# Patient Record
Sex: MALE | Race: OTHER | NOT HISPANIC OR LATINO | URBAN - METROPOLITAN AREA
[De-identification: names, ages, dates, MRNs, and addresses within clinical notes are randomized per-mention and may not be internally consistent; named-entity substitution may affect disease eponyms.]

---

## 2018-01-10 ENCOUNTER — EMERGENCY (EMERGENCY)
Facility: HOSPITAL | Age: 23
LOS: 1 days | Discharge: HOME | End: 2018-01-10
Admitting: EMERGENCY MEDICINE
Payer: COMMERCIAL

## 2018-01-10 VITALS
SYSTOLIC BLOOD PRESSURE: 122 MMHG | HEART RATE: 76 BPM | OXYGEN SATURATION: 100 % | TEMPERATURE: 98 F | DIASTOLIC BLOOD PRESSURE: 70 MMHG | RESPIRATION RATE: 16 BRPM

## 2018-01-10 DIAGNOSIS — F43.23 ADJUSTMENT DISORDER WITH MIXED ANXIETY AND DEPRESSED MOOD: ICD-10-CM

## 2018-01-10 PROCEDURE — 90792 PSYCH DIAG EVAL W/MED SRVCS: CPT

## 2018-01-10 PROCEDURE — 70450 CT HEAD/BRAIN W/O DYE: CPT | Mod: 26

## 2018-01-10 PROCEDURE — 99285 EMERGENCY DEPT VISIT HI MDM: CPT

## 2018-01-10 NOTE — ED BEHAVIORAL HEALTH ASSESSMENT NOTE - SUICIDE PROTECTIVE FACTORS
Identifies reasons for living/Future oriented/Engaged in work or school/Responsibility to family and others/Supportive social network or family

## 2018-01-10 NOTE — ED PROVIDER NOTE - MEDICAL DECISION MAKING DETAILS
23y/o Male no prior psych hx brought to for psych eval 2/2 self inflicted laceration last night to his rt wrist.  Pt is LHD and tdap UTD.  Rt wrist w/ one single superficial lac approximately 2cm, healing- strong pulses-  Clear for d/c- 23y/o Male no prior psych hx brought to for psych eval 2/2 self inflicted laceration last night to his rt wrist.  Pt is LHD and tdap UTD.  Rt wrist w/ one single superficial lac approximately 2cm, healing- strong pulses-  Neuro- CNII- XII grossly intact, alert and oriented x 3- Clear for d/c-

## 2018-01-10 NOTE — ED BEHAVIORAL HEALTH ASSESSMENT NOTE - SAFETY PLAN DETAILS
patient advised to return to ED or call 911 for any worsening symptoms and patient agreed. Call [email or web address] if you need to speak with someone 24/7. Secure sharps and medications in the home
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
## 2018-01-10 NOTE — ED ADULT NURSE NOTE - OBJECTIVE STATEMENT
Pt. A&Ox4, presents to ER with haydee and haydee's father and for attempted suicide last night. Pt. states he cut his right wrist with a piece of glass. Denies any other SI/HI thoughts or attempts. Denies psych history or medical problems. haydee's father states that pt. has been kicked out of foster care and living in his car x few months. Now living with haydee's family. No alcohol or drug used noted. Pt. states that he has been having headaches and thinks he might have a tumor. VSS in triage, safety measures maintained. Will continue to monitor.

## 2018-01-10 NOTE — ED BEHAVIORAL HEALTH NOTE - BEHAVIORAL HEALTH NOTE
Writer was informed patient was medically and psychiatrically cleared for discharge.  Writer met with patient to discuss urgent referral.  Patient states he has [employer] insurance.  He is agreeable to an outpatient referral stating he works 30 hours a week, but will make accommodations around an appointment.  Patient can receive voice messages at [phone] or emails to [email or web address]. Writer was informed patient was medically and psychiatrically cleared for discharge.  Writer met with patient to discuss urgent referral.  Patient states he has [employer] insurance.  He is agreeable to an outpatient referral stating he works 30 hours a week, but will make accommodations around an appointment.  Patient can receive voice messages at [phone] or emails to [email or web address].   Cirar called [facility] [phone] however they will not see patient's under 21 Writer called [facility] [phone] but was informed patient will have to call on his own to schedule his own appointment.  Writer called patient  left a voicemail with instructions and sent patient an email with information.
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
## 2018-01-10 NOTE — ED BEHAVIORAL HEALTH ASSESSMENT NOTE - DESCRIPTION
calm and cooperative  Vital Signs Last 24 Hrs  T(C): 36.7 (10 Andrew 2018 11:59), Max: 36.7 (10 Andrew 2018 11:59)  T(F): 98 (10 Andrew 2018 11:59), Max: 98 (10 Andrew 2018 11:59)  HR: 76 (10 Andrew 2018 11:59) (76 - 76)  BP: 122/70 (10 Andrew 2018 11:59) (122/70 - 122/70)  BP(mean): --  RR: 16 (10 Andrew 2018 11:59) (16 - 16)  SpO2: 100% (10 Andrew 2018 11:59) (100% - 100%) childhood asthma lives with girlfriend and her family, works at [employer]

## 2018-01-10 NOTE — ED PROVIDER NOTE - CHPI ED SYMPTOMS NEG
no weakness/no weight loss/no confusion/no agitation/no suicidal/no hallucinations/no change in level of consciousness/no homicidal/no paranoia/no disorientation

## 2018-01-10 NOTE — ED PROVIDER NOTE - OBJECTIVE STATEMENT
The patient is a 22y Male The patient is a 22y Male no prior psych hx brought to for psych eval 2/2 self inflicted laceration last night to his wrist.  Pt is LHD and tdap UTD.  Pt reported cutting his wrist w/ a broken glass.  Pt reported ongoing intermittent HA since October of 2017 w/o nausea or vomiting, + blurry vision at times, takes tylenol for HA w/ some improvement.  Pt reports feeling anxious thinking that he may have a tumor.  Pt denies all other medical complaints at present, no Etoh or illicit drugs.  Denies trauma or falls, no back or neck pain, no abd/flank pain, no uti/urinary symptoms, no fever or chills, no cp or sob, no palpitations or diaphoresis.  Denies SI/HI, no AVH.
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
## 2018-01-11 NOTE — ED BEHAVIORAL HEALTH NOTE - BEHAVIORAL HEALTH NOTE
This writer called the patient and spoke with him regarding entering OP psychiatric treatment. He stated he received the email sent by a colleague yesterday, telling him that he must call the clinic identified and make his own appointment. He stated he plans to do so. No further social work intervention is needed at this time. This writer called the patient at [phone], and spoke with him regarding entering OP psychiatric treatment. He stated he received the email sent by a colleague yesterday, telling him that he must call the clinic identified, [facility], [phone], as they require, and make his own appointment. He stated he plans to do so. No further social work intervention is needed at this time.

## 2021-09-10 ENCOUNTER — EMERGENCY (EMERGENCY)
Facility: HOSPITAL | Age: 26
LOS: 0 days | Discharge: HOME | End: 2021-09-10
Attending: STUDENT IN AN ORGANIZED HEALTH CARE EDUCATION/TRAINING PROGRAM
Payer: COMMERCIAL

## 2021-09-10 VITALS
TEMPERATURE: 98 F | OXYGEN SATURATION: 100 % | HEART RATE: 69 BPM | SYSTOLIC BLOOD PRESSURE: 121 MMHG | DIASTOLIC BLOOD PRESSURE: 60 MMHG | RESPIRATION RATE: 17 BRPM

## 2021-09-10 VITALS
HEIGHT: 72 IN | WEIGHT: 171.96 LBS | DIASTOLIC BLOOD PRESSURE: 55 MMHG | TEMPERATURE: 98 F | OXYGEN SATURATION: 100 % | HEART RATE: 80 BPM | SYSTOLIC BLOOD PRESSURE: 123 MMHG | RESPIRATION RATE: 18 BRPM

## 2021-09-10 DIAGNOSIS — S06.0X0A CONCUSSION WITHOUT LOSS OF CONSCIOUSNESS, INITIAL ENCOUNTER: ICD-10-CM

## 2021-09-10 DIAGNOSIS — Z23 ENCOUNTER FOR IMMUNIZATION: ICD-10-CM
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
## 2021-09-10 DIAGNOSIS — M54.5 LOW BACK PAIN: ICD-10-CM

## 2021-09-10 DIAGNOSIS — R51.9 HEADACHE, UNSPECIFIED: ICD-10-CM

## 2021-09-10 DIAGNOSIS — R42 DIZZINESS AND GIDDINESS: ICD-10-CM

## 2021-09-10 DIAGNOSIS — M79.651 PAIN IN RIGHT THIGH: ICD-10-CM
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
## 2021-09-10 DIAGNOSIS — S50.311A ABRASION OF RIGHT ELBOW, INITIAL ENCOUNTER: ICD-10-CM

## 2021-09-10 DIAGNOSIS — M25.521 PAIN IN RIGHT ELBOW: ICD-10-CM

## 2021-09-10 DIAGNOSIS — M89.70: ICD-10-CM

## 2021-09-10 LAB
ALBUMIN SERPL ELPH-MCNC: 4.4 G/DL — SIGNIFICANT CHANGE UP (ref 3.3–5)
ALP SERPL-CCNC: 59 U/L — SIGNIFICANT CHANGE UP (ref 40–120)
ALT FLD-CCNC: 27 U/L — SIGNIFICANT CHANGE UP (ref 12–78)
ANION GAP SERPL CALC-SCNC: 4 MMOL/L — LOW (ref 5–17)
AST SERPL-CCNC: 25 U/L — SIGNIFICANT CHANGE UP (ref 15–37)
BASOPHILS # BLD AUTO: 0.03 K/UL — SIGNIFICANT CHANGE UP (ref 0–0.2)
BASOPHILS NFR BLD AUTO: 0.4 % — SIGNIFICANT CHANGE UP (ref 0–2)
BILIRUB SERPL-MCNC: 1.2 MG/DL — SIGNIFICANT CHANGE UP (ref 0.2–1.2)
BUN SERPL-MCNC: 13 MG/DL — SIGNIFICANT CHANGE UP (ref 7–23)
CALCIUM SERPL-MCNC: 9.2 MG/DL — SIGNIFICANT CHANGE UP (ref 8.5–10.1)
CHLORIDE SERPL-SCNC: 108 MMOL/L — SIGNIFICANT CHANGE UP (ref 96–108)
CO2 SERPL-SCNC: 27 MMOL/L — SIGNIFICANT CHANGE UP (ref 22–31)
CREAT SERPL-MCNC: 0.72 MG/DL — SIGNIFICANT CHANGE UP (ref 0.5–1.3)
EOSINOPHIL # BLD AUTO: 0.03 K/UL — SIGNIFICANT CHANGE UP (ref 0–0.5)
EOSINOPHIL NFR BLD AUTO: 0.4 % — SIGNIFICANT CHANGE UP (ref 0–6)
GLUCOSE SERPL-MCNC: 85 MG/DL — SIGNIFICANT CHANGE UP (ref 70–99)
HCT VFR BLD CALC: 41.4 % — SIGNIFICANT CHANGE UP (ref 39–50)
HGB BLD-MCNC: 13.6 G/DL — SIGNIFICANT CHANGE UP (ref 13–17)
IMM GRANULOCYTES NFR BLD AUTO: 0.1 % — SIGNIFICANT CHANGE UP (ref 0–1.5)
LACTATE SERPL-SCNC: 0.6 MMOL/L — LOW (ref 0.7–2)
LIDOCAIN IGE QN: 95 U/L — SIGNIFICANT CHANGE UP (ref 73–393)
LYMPHOCYTES # BLD AUTO: 1.99 K/UL — SIGNIFICANT CHANGE UP (ref 1–3.3)
LYMPHOCYTES # BLD AUTO: 24.2 % — SIGNIFICANT CHANGE UP (ref 13–44)
MCHC RBC-ENTMCNC: 28.4 PG — SIGNIFICANT CHANGE UP (ref 27–34)
MCHC RBC-ENTMCNC: 32.9 GM/DL — SIGNIFICANT CHANGE UP (ref 32–36)
MCV RBC AUTO: 86.4 FL — SIGNIFICANT CHANGE UP (ref 80–100)
MONOCYTES # BLD AUTO: 0.41 K/UL — SIGNIFICANT CHANGE UP (ref 0–0.9)
MONOCYTES NFR BLD AUTO: 5 % — SIGNIFICANT CHANGE UP (ref 2–14)
NEUTROPHILS # BLD AUTO: 5.75 K/UL — SIGNIFICANT CHANGE UP (ref 1.8–7.4)
NEUTROPHILS NFR BLD AUTO: 69.9 % — SIGNIFICANT CHANGE UP (ref 43–77)
PLATELET # BLD AUTO: 359 K/UL — SIGNIFICANT CHANGE UP (ref 150–400)
POTASSIUM SERPL-MCNC: 4.3 MMOL/L — SIGNIFICANT CHANGE UP (ref 3.5–5.3)
POTASSIUM SERPL-SCNC: 4.3 MMOL/L — SIGNIFICANT CHANGE UP (ref 3.5–5.3)
PROT SERPL-MCNC: 7.8 GM/DL — SIGNIFICANT CHANGE UP (ref 6–8.3)
RBC # BLD: 4.79 M/UL — SIGNIFICANT CHANGE UP (ref 4.2–5.8)
RBC # FLD: 12.9 % — SIGNIFICANT CHANGE UP (ref 10.3–14.5)
SODIUM SERPL-SCNC: 139 MMOL/L — SIGNIFICANT CHANGE UP (ref 135–145)
WBC # BLD: 8.22 K/UL — SIGNIFICANT CHANGE UP (ref 3.8–10.5)
WBC # FLD AUTO: 8.22 K/UL — SIGNIFICANT CHANGE UP (ref 3.8–10.5)

## 2021-09-10 PROCEDURE — 83690 ASSAY OF LIPASE: CPT

## 2021-09-10 PROCEDURE — 85025 COMPLETE CBC W/AUTO DIFF WBC: CPT

## 2021-09-10 PROCEDURE — 73080 X-RAY EXAM OF ELBOW: CPT | Mod: RT

## 2021-09-10 PROCEDURE — 99285 EMERGENCY DEPT VISIT HI MDM: CPT

## 2021-09-10 PROCEDURE — 74177 CT ABD & PELVIS W/CONTRAST: CPT | Mod: 26,MA

## 2021-09-10 PROCEDURE — 72125 CT NECK SPINE W/O DYE: CPT | Mod: 26,MA

## 2021-09-10 PROCEDURE — 72170 X-RAY EXAM OF PELVIS: CPT

## 2021-09-10 PROCEDURE — 96374 THER/PROPH/DIAG INJ IV PUSH: CPT | Mod: XU

## 2021-09-10 PROCEDURE — 83605 ASSAY OF LACTIC ACID: CPT

## 2021-09-10 PROCEDURE — 90471 IMMUNIZATION ADMIN: CPT

## 2021-09-10 PROCEDURE — 70450 CT HEAD/BRAIN W/O DYE: CPT

## 2021-09-10 PROCEDURE — 71045 X-RAY EXAM CHEST 1 VIEW: CPT | Mod: 26,76

## 2021-09-10 PROCEDURE — 74177 CT ABD & PELVIS W/CONTRAST: CPT

## 2021-09-10 PROCEDURE — 90715 TDAP VACCINE 7 YRS/> IM: CPT

## 2021-09-10 PROCEDURE — 36415 COLL VENOUS BLD VENIPUNCTURE: CPT

## 2021-09-10 PROCEDURE — 70450 CT HEAD/BRAIN W/O DYE: CPT | Mod: 26,MA

## 2021-09-10 PROCEDURE — 72170 X-RAY EXAM OF PELVIS: CPT | Mod: 26

## 2021-09-10 PROCEDURE — 71045 X-RAY EXAM CHEST 1 VIEW: CPT

## 2021-09-10 PROCEDURE — 99284 EMERGENCY DEPT VISIT MOD MDM: CPT | Mod: 25

## 2021-09-10 PROCEDURE — 73080 X-RAY EXAM OF ELBOW: CPT | Mod: 26,RT

## 2021-09-10 PROCEDURE — 72125 CT NECK SPINE W/O DYE: CPT

## 2021-09-10 PROCEDURE — 80053 COMPREHEN METABOLIC PANEL: CPT

## 2021-09-10 RX ORDER — ACETAMINOPHEN 500 MG
975 TABLET ORAL ONCE
Refills: 0 | Status: COMPLETED | OUTPATIENT
Start: 2021-09-10 | End: 2021-09-10

## 2021-09-10 RX ORDER — TETANUS TOXOID, REDUCED DIPHTHERIA TOXOID AND ACELLULAR PERTUSSIS VACCINE, ADSORBED 5; 2.5; 8; 8; 2.5 [IU]/.5ML; [IU]/.5ML; UG/.5ML; UG/.5ML; UG/.5ML
0.5 SUSPENSION INTRAMUSCULAR ONCE
Refills: 0 | Status: COMPLETED | OUTPATIENT
Start: 2021-09-10 | End: 2021-09-10

## 2021-09-10 RX ORDER — KETOROLAC TROMETHAMINE 30 MG/ML
15 SYRINGE (ML) INJECTION ONCE
Refills: 0 | Status: DISCONTINUED | OUTPATIENT
Start: 2021-09-10 | End: 2021-09-10

## 2021-09-10 RX ORDER — LIDOCAINE 4 G/100G
1 CREAM TOPICAL ONCE
Refills: 0 | Status: COMPLETED | OUTPATIENT
Start: 2021-09-10 | End: 2021-09-10

## 2021-09-10 RX ADMIN — Medication 15 MILLIGRAM(S): at 14:14

## 2021-09-10 RX ADMIN — TETANUS TOXOID, REDUCED DIPHTHERIA TOXOID AND ACELLULAR PERTUSSIS VACCINE, ADSORBED 0.5 MILLILITER(S): 5; 2.5; 8; 8; 2.5 SUSPENSION INTRAMUSCULAR at 12:46

## 2021-09-10 RX ADMIN — LIDOCAINE 1 PATCH: 4 CREAM TOPICAL at 14:44

## 2021-09-10 RX ADMIN — Medication 975 MILLIGRAM(S): at 12:47

## 2021-09-10 NOTE — ED ADULT TRIAGE NOTE - CHIEF COMPLAINT QUOTE
fell out of back of 4-5ft trailer at work. c/o right lower back pain, right elbow pain and headache. Denies loc. Laceration on right elbow. Denies dizziness. fell out of back of 4-5ft trailer at work. c/o right lower back pain, right elbow pain and headache. Denies loc. Abrasion on right elbow. Denies dizziness.

## 2021-09-10 NOTE — ED STATDOCS - NSFOLLOWUPINSTRUCTIONS_ED_ALL_ED_FT
You were seen in the Emergency Department for back pain after a fall.   Today you had bloodwork, CT scan, the results are attached to this paperwork.     Please follow-up with the orthopedic doctor within the next 1-2 weeks, a rapid referral was placed for you so expect a call within the next 1-2 days.     1) Advance activity as tolerated.    2) Continue all previously prescribed medications as directed.    3) Follow up with your primary care physician in 24-48 hours - take copies of your results.    4) Return to the Emergency Department for worsening or persistent symptoms, and/or ANY NEW OR CONCERNING SYMPTOMS as described below.    Back Pain    Back pain is very common in adults. The cause of back pain is rarely dangerous and the pain often gets better over time. The cause of your back pain may not be known and may include strain of muscles or ligaments, degeneration of the spinal disks, or arthritis. Occasionally the pain may radiate down your leg(s). Over-the-counter medicines to reduce pain and inflammation are often the most helpful. Stretching and remaining active frequently helps the healing process.     Low Back Strain  A strain is a stretch or tear in a muscle or the strong cords of tissue that attach muscle to bone (tendons). Strains of the lower back (lumbar spine) are a common cause of low back pain. A strain occurs when muscles or tendons are torn or are stretched beyond their limits. The muscles may become inflamed, resulting in pain and sudden muscle tightening (spasms). A strain can happen suddenly due to an injury (trauma), or it can develop gradually due to overuse.    What increases the risk?  The following factors may increase your risk of getting this condition:  Playing contact sports.  Participating in sports or activities that put excessive stress on the back and require a lot of bending and twisting, including:  Lifting weights or heavy objects, Gymnastics, Soccer, Figure skating, Snowboarding, Being overweight or obese, Having poor strength and flexibility.    What are the signs or symptoms?  Symptoms of this condition may include:  Sharp or dull pain in the lower back that does not go away. Pain may extend to the buttocks.  Stiffness.  Limited range of motion.  Inability to stand up straight due to stiffness or pain.  Muscle spasms.    How is this diagnosed?  This condition may be diagnosed based on:  Your symptoms, Your medical history, A physical exam, Your health care provider may push on certain areas of your back to determine the source of your pain. You may be asked to bend forward, backward, and side to side to assess the severity of your pain and your range of motion.  Imaging tests, such as:  X-rays, MRI.    How is this treated?  Treatment for this condition may include:  Applying heat and cold to the affected area.  Medicines to help relieve pain and to relax your muscles (muscle relaxants).  NSAIDs to help reduce swelling and discomfort.  Physical therapy.  When your symptoms improve, it is important to gradually return to your normal routine as soon as possible to reduce pain, avoid stiffness, and avoid loss of muscle strength. Generally, symptoms should improve within 6 weeks of treatment. However, recovery time varies.    Follow these instructions at home:  Managing pain, stiffness, and swelling     If directed, apply ice to the injured area during the first 24 hours after your injury.  Put ice in a plastic bag.  Place a towel between your skin and the bag.  Leave the ice on for 20 minutes, 2–3 times a day.  If directed, apply heat to the affected area as often as told by your health care provider. Use the heat source that your health care provider recommends, such as a moist heat pack or a heating pad.  Place a towel between your skin and the heat source.  Leave the heat on for 20–30 minutes.  Remove the heat if your skin turns bright red. This is especially important if you are unable to feel pain, heat, or cold. You may have a greater risk of getting burned.  Activity     Rest and return to your normal activities as told by your health care provider. Ask your health care provider what activities are safe for you.  Avoid activities that take a lot of effort (are strenuous) for as long as told by your health care provider.  Do exercises as told by your health care provider.  General instructions     Take over-the-counter and prescription medicines only as told by your health care provider.  If you have questions or concerns about safety while taking pain medicine, talk with your health care provider.  Do not drive or operate heavy machinery until you know how your pain medicine affects you.  Do not use any tobacco products, such as cigarettes, chewing tobacco, and e-cigarettes. Tobacco can delay bone healing. If you need help quitting, ask your health care provider.  Keep all follow-up visits as told by your health care provider. This is important.  How is this prevented?  Image Image Image Image Image   Warm up and stretch before being active.  Cool down and stretch after being active.  Give your body time to rest between periods of activity.  Avoid:  Being physically inactive for long periods at a time.  Exercising or playing sports when you are tired or in pain.  Use correct form when playing sports and lifting heavy objects.  Use good posture when sitting and standing.  Maintain a healthy weight.  Sleep on a mattress with medium firmness to support your back.  Make sure to use equipment that fits you, including shoes that fit well.  Be safe and responsible while being active to avoid falls.  Do at least 150 minutes of moderate-intensity exercise each week, such as brisk walking or water aerobics. Try a form of exercise that takes stress off your back, such as swimming or stationary cycling.  Maintain physical fitness, including:  Strength.  Flexibility.  Cardiovascular fitness.  Endurance.  Contact a health care provider if:  Your back pain does not improve after 6 weeks of treatment.  Your symptoms get worse.  Get help right away if:  Your back pain is severe.  You are unable to stand or walk.  You develop pain in your legs.  You develop weakness in your buttocks or legs.  You have difficulty controlling when you urinate or when you have a bowel movement.  This information is not intended to replace advice given to you by your health care provider. Make sure you discuss any questions you have with your health care provider.      SEEK IMMEDIATE MEDICAL CARE IF YOU HAVE ANY OF THE FOLLOWING SYMPTOMS: bowel or bladder control problems, unusual weakness or numbness in your arms or legs, nausea or vomiting, abdominal pain, fever, dizziness/lightheadedness.

## 2021-09-10 NOTE — ED STATDOCS - PROGRESS NOTE DETAILS
Logan Niño: 26 y/o M with no significant PMHx presents to the ED s/o fall out of back of 4-5ft trailer at work. c/o R lower back pain, R elbow pain and HA. States his back and buttocks are bothering him the most. States he can walk but that it is very painful. No LOC. (+) head injury, states he was dizzy and felt lightheaded but did not synopsize. No CP or difficulty breathing. Unknown Tetanus status. 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 c/o R lower back pain, R elbow pain and HA. States his back and buttocks are bothering him the most. States he can walk but that it is very painful. No LOC. (+) head injury, states he was dizzy and felt lightheaded but did not synopsize. No CP or difficulty breathing. Unknown Tetanus status. MDM: 26 y/o M with closed head injury and R sided back pain. Labs, X ray CT and re-eval. Tamar, PGY-3: CTAP without bony or organ injury, no spinal fractures noted per radiology attg read. Patient ambulatory now but with some difficulty 2/2 pain. once labwork/imaging negative, will dc with outpatient ortho f/u. Tamar, PGY-3: CTAP without bony or organ injury, no spinal fractures noted per radiology attg read. Patient ambulatory now but with some difficulty 2/2 pain. once labwork/imaging negative, will dc with outpatient ortho f/u. CXR read seen about "osseous deformity" on the right. Patient states he had an old rib fracture, denies any current rib/chest pain. will obtain AP/Lateral XR to be thorough and then d/c. Renay DO: all xray/ct scans reviewed with patient; f/u with ortho; strict return precautions given.

## 2021-09-10 NOTE — ED STATDOCS - PHYSICAL EXAMINATION
[Const] well-appearing, resting comfortably, no acute distress  [HEENT] PERRL, EOMI, moist mucus membranes  [Neck] Supple, trachea midline  [CV] +S1/S2, no m/r/g appreciated  [Lungs] Clear to auscultations bilaterally, no adventitious lung sounds  [Abd] soft, non-tender, nondistended in all 4 quadrants  [MSK] 5/5 upper extremity and lower extremity str bilaterally, right elbow abrasion  [Skin] warm, dry, well-perfused  [Neuro] A&Ox3, gait intacT ,CN II-XII intact [Const] pt appears uncomfortable sitting in chair  [HEENT] PERRL, EOMI, moist mucus membranes  [Neck] Supple, trachea midline, no midline cervical TTP  [CV] +S1/S2, no m/r/g appreciated  [Lungs] Clear to auscultations bilaterally, no adventitious lung sounds  [Abd] soft, non-tender, nondistended in all 4 quadrants  [MSK] midline lower lumbar/sacral TTP, unable to stand/walk 2/2 pain, r paraspinal lumbar TTP right elbow abrasion  [Skin] warm, dry, well-perfused  [Neuro] A&Ox3, gait not intact at this time 2/2 pain, CN II-XII intact

## 2021-09-10 NOTE — ED STATDOCS - ATTENDING CONTRIBUTION TO CARE
I, Brittney Niño DO,  performed the initial face to face bedside interview with this patient regarding history of present illness, review of symptoms and relevant past medical, social and family history.  I completed an independent physical examination.  I was the initial provider who evaluated this patient. I have signed out the follow up of any pending tests (i.e. labs, radiological studies) to the resident.  I have communicated the patient’s plan of care and disposition with the resident.  The history, relevant review of systems, past medical and surgical history, medical decision making, and physical examination was documented by the scribe in my presence and I attest to the accuracy of the documentation.

## 2021-09-10 NOTE — ED STATDOCS - CLINICAL SUMMARY MEDICAL DECISION MAKING FREE TEXT BOX
26 y/o m no pmh presents with concussion s/p 4 foot fall, right elbow abrasion, right msk lower back and buttock pain, no focal deficits. pain control, likely d/c and outpatient f/u

## 2021-09-10 NOTE — ED STATDOCS - OBJECTIVE STATEMENT
24 y/o M w/ no pmh presents s/p fall and c/o right elbow, lower right back, buttocks pain. Standing in trailer, fell backwards, mechanical slip approximately 4 feet, hit head, +head trauma, no LOC. [place]ave[place]. After standing up had intermittent dizziness/nausea. Ambulatory however difficult 2/2 right lower back pain. Denies any other msk deformity or injury. Last tetanus shot unknown. did not take tylenol/ibuprofen today. denies focal weakness, changes in strength/sensation, denies blurry vision or dizziness at this moment.     pmh: denies  meds: denies  allergies: denies  surg: denies  social: denies smoking drinking or illicit drugs  PCP: does not have one, requests referral

## 2021-09-10 NOTE — ED STATDOCS - PATIENT PORTAL LINK FT
You can access the FollowMyHealth Patient Portal offered by [facility] by registering at the following website: [email or web address]. By joining [employer]’s FollowMyHealth portal, you will also be able to view your health information using other applications (apps) compatible with our system.

## 2021-09-13 NOTE — ED POST DISCHARGE NOTE - DETAILS
SPoke with pt. Pt to f/u with pmd for further evaluation of the density noted on the CXR. -Esau Larkin PA-C

## 2021-09-13 NOTE — ED POST DISCHARGE NOTE - ADDITIONAL DOCUMENTATION
Tamar, PGY-3: pt called to get worknote, spoke to patient  who states patient should come to ED with ID to verify identity. Patient will also call referral coordinator to expedite ortho appt.

## 2021-09-20 PROBLEM — Z00.00 ENCOUNTER FOR PREVENTIVE HEALTH EXAMINATION: Status: ACTIVE | Noted: 2021-09-20

## 2021-09-22 ENCOUNTER — APPOINTMENT (OUTPATIENT)
Dept: ORTHOPEDIC SURGERY | Facility: CLINIC | Age: 26
End: 2021-09-22
Payer: OTHER MISCELLANEOUS

## 2021-09-22 PROCEDURE — 99072 ADDL SUPL MATRL&STAF TM PHE: CPT

## 2021-09-22 PROCEDURE — 99203 OFFICE O/P NEW LOW 30 MIN: CPT

## 2021-09-22 NOTE — DISCUSSION/SUMMARY
[de-identified] : Discussed findings of today's exam and possible causes of patient's pain.  Educated patient on their most probable diagnosis of right cervicothoracic and periscapular back pain, and right low back pain due to whiplash type injury when patient fell from his truck at work.  Reviewed possible courses of treatment, and we collaboratively decided best course of treatment at this time will include conservative management.  Patient appears to have primary etiology of pain due to lumbar and cervical thoracic paraspinal muscle spasm, he has intermittent numbness of the left lower extremity, but no reproduction of radiculopathy on provocative testing today.  Doubt acute disc herniation.  Patient started on a course of oral NSAIDs, prescription given for Mobic (We discussed all possible side effects of this medication).  Patient will be started on a course of physical therapy to restore normal range of motion and strength as tolerated.  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  He will be reassessed in 2 weeks.  Patient appreciates and agrees with current plan.\par \par I work as part of an academic orthopedic group and routinely have a physician in training (resident / fellow) working with me.  Any part of the history and physical exam performed by the physician in training was either directly reviewed and/or replicated by myself.  Any procedure performed by the physician in training was performed under my direct supervision and with the consent of the patient.\par \par This note was generated using dragon medical dictation software.  A reasonable effort has been made for proofreading its contents, but typos may still remain.  If there are any questions or points of clarification needed please notify my office.

## 2021-09-22 NOTE — PHYSICAL EXAM
[de-identified] : Constitutional: Well-nourished, well-developed, No acute distress\par Respiratory:  Good respiratory effort, no SOB\par Lymphatic: No regional lymphadenopathy, no lymphedema\par Psychiatric: Pleasant and normal affect, alert and oriented x3\par Skin: Clean dry and intact B/L UE/LE\par Musculoskeletal: normal except where as noted in regional exam\par \par Cervical Spine Exam\par APPEARANCE: no marked deformities or malalignment, normal curvature, good posture\par POSITIVE TENDERNESS: + Right upper trapezius, levator scapula, rhomboid major, and rhomboid minor, + spasm noted in the same muscles.\par NONTENDER: no bony midline tenderness.\par ROM: limited in all planes, most notably in flexion and sidebending due to pain\par RESISTIVE TESTING: painful 4/5 resisted ext, bilateral sidebending, rotation and shoulder shrug , 5/5 flexion \par SPECIAL TESTS: neg Spurling's b/l\par Vasc: 2+ radial pulse b/l\par Neuro: C5 - T1 intact to motor, DTRs 2+/4 biceps, triceps, brachioradialis\par Sensation: Intact to light touch throughout b/l UE\par \par Thoracic Spine Exam:\par \par normal curvature and normal alignment. good posture. no midline bony tenderness, + marked spasm and associated tenderness of right paraspinal and periscapular muscles.  ROM mildly limited in sidebending and rotation due to noted spasm\par \par Lumbar Spine Exam\par \par APPEARANCE: no marked deformities or malalignment, normal curvature of the lumbosacral spine. good posture\par POSITIVE TENDERNESS: + Right lumbar tenderness and spasm noted in erector spinae and quadratus lumborum\par NONTENDER: no bony midline tenderness, no marked tenderness in left lumbar musculature.\par ROM: full, although painful at end range of flexion and extension\par RESISTIVE TESTING: painless 5/5 resisted flex/ext, sidebending b/l, and rotation\par SPECIAL TESTS: neg SLR b/l, neg WILBER b/l, neg Trendelenburg b/l \par PULSES: 2+ DP/PT pulses\par NEURO:  L1 - S2 intact to sensation and motor, DTRs 2+/4 patella and achilles\par  [de-identified] : I reviewed, interpreted and clinically correlated the following outside imaging studies,\par  EXAM: XR PELVIS AP ONLY 1-2 VIEWS\par \par \par PROCEDURE DATE: 09/10/2021\par \par \par \par INTERPRETATION: INDICATION: Trauma Code\par \par PRIORS: None\par \par VIEWS: Supine AP radiography of the pelvis is performed.\par \par FINDINGS: There is no evidence for acute fracture of the osseous pelvis. No widening of the symphysis pubis or sacroiliac joints identified.\par \par There is no evidence for hip dislocation. No significant degenerative or inflammatory arthropathy of the bilateral hip joints is noted.\par \par Note is made of spina bifida occulta.\par \par IMPRESSION: No evidence for acute fracture of the osseous pelvis. No hip dislocation identified. No significant degenerative or inflammatory arthropathy of the hip region evident.\par \par \par \par EXAM: CT ABDOMEN AND PELVIS IC\par \par \par PROCEDURE DATE: 09/10/2021\par \par \par \par INTERPRETATION: CLINICAL INFORMATION: Status post fall. Unable to ambulate.\par \par COMPARISON: None.\par \par CONTRAST/COMPLICATIONS:\par IV Contrast: Omnipaque 350 90 cc administered 10 cc discarded\par Oral Contrast: NONE\par Complications: None reported at time of study completion\par \par PROCEDURE:\par CT of the Abdomen and Pelvis was performed.\par Sagittal and coronal reformats were performed.\par \par FINDINGS:\par LOWER CHEST: Within normal limits.\par \par LIVER: Within normal limits.\par BILE DUCTS: Normal caliber.\par GALLBLADDER: Within normal limits.\par SPLEEN: Within normal limits.\par PANCREAS: Within normal limits.\par ADRENALS: Within normal limits.\par KIDNEYS/URETERS: Within normal limits.\par \par BLADDER: Within normal limits.\par REPRODUCTIVE ORGANS: The prostate is not enlarged.\par \par BOWEL: No bowel obstruction. Appendix is normal.\par PERITONEUM: No ascites.\par VESSELS: Within normal limits.\par RETROPERITONEUM/LYMPH NODES: No lymphadenopathy.\par ABDOMINAL WALL: Within normal limits.\par BONES: Within normal limits. Muscles are unremarkable in appearance.\par \par IMPRESSION: No evidence of acute visceral organ or bony injury in the abdomen and pelvis.\par

## 2021-09-22 NOTE — RETURN TO WORK/SCHOOL
[FreeTextEntry1] : Suleiman was seen today for evaluation of back pain after work injury sustained on 9/10/2021.  He has been unable to perform his work duties since time of injury, and he is still unable to perform his work duties at this time.  He is being started on a course of treatment.  He will be reassessed in 2 weeks.  This note expires on 10/6/2021.\par Thank you for your understanding.\par \par Sincerely,\par \par Reno Stahl DO, ATC\par Primary Care Sports Medicine\par [facility] [place]\par

## 2021-09-22 NOTE — HISTORY OF PRESENT ILLNESS
[de-identified] : Patient is here for LBP and right-sided neck/shoulder blade pain that began on 9/10/21 when he fell out of his truck and onto his right side. He went to the ER where he had xrays and a CT scan performed. He was told that he could return to work last week but he did not because of his pain. He states that he feels numbness in his left leg when sitting. Denies prior injury/saddle anesthesia/bowel or bladder dysfunction. \par \par The patient's past medical history, past surgical history, medications and allergies were reviewed by me today and documented accordingly. In addition, the patient's family and social history, which were noncontributory to this visit, were reviewed also. Intake form was reviewed. The patient has no family history of arthritis.

## 2021-10-06 ENCOUNTER — APPOINTMENT (OUTPATIENT)
Dept: ORTHOPEDIC SURGERY | Facility: CLINIC | Age: 26
End: 2021-10-06
Payer: OTHER MISCELLANEOUS

## 2021-10-06 PROCEDURE — 99072 ADDL SUPL MATRL&STAF TM PHE: CPT

## 2021-10-06 PROCEDURE — 99213 OFFICE O/P EST LOW 20 MIN: CPT

## 2021-10-06 RX ORDER — MELOXICAM 7.5 MG/1
7.5 TABLET ORAL
Qty: 30 | Refills: 0 | Status: ACTIVE | COMMUNITY
Start: 2021-09-23 | End: 1900-01-01

## 2021-10-07 NOTE — HISTORY OF PRESENT ILLNESS
[de-identified] : Patient is here for LBP follow up. He states that he feels about the same. He just started PT yesterday as WC did not approve his visits. He is not taking pain medication. There has been no recent injury.

## 2021-10-07 NOTE — PHYSICAL EXAM
[de-identified] : Constitutional: Well-nourished, well-developed, No acute distress\par Respiratory:  Good respiratory effort, no SOB\par Psychiatric: Pleasant and normal affect, alert and oriented x3\par Musculoskeletal: normal except where as noted in regional exam\par \par Cervical Spine Exam\par APPEARANCE: no marked deformities or malalignment, normal curvature, good posture\par POSITIVE TENDERNESS: + Right upper trapezius, levator scapula, rhomboid major, and rhomboid minor, + spasm noted in the same muscles.\par NONTENDER: no bony midline tenderness.\par ROM: limited in all planes, most notably in flexion and sidebending due to pain\par RESISTIVE TESTING: painful 4/5 resisted ext, bilateral sidebending, rotation and shoulder shrug , 5/5 flexion \par SPECIAL TESTS: neg Spurling's b/l\par Vasc: 2+ radial pulse b/l\par Neuro: C5 - T1 intact to motor, DTRs 2+/4 biceps, triceps, brachioradialis\par Sensation: Intact to light touch throughout b/l UE\par \par Thoracic Spine Exam:\par \par normal curvature and normal alignment. good posture. no midline bony tenderness, + marked spasm and associated tenderness of right paraspinal and periscapular muscles.  ROM mildly limited in sidebending and rotation due to noted spasm\par \par Lumbar Spine Exam\par \par APPEARANCE: no marked deformities or malalignment, normal curvature of the lumbosacral spine. good posture\par POSITIVE TENDERNESS: + Right lumbar tenderness and spasm noted in erector spinae and quadratus lumborum\par NONTENDER: no bony midline tenderness, no marked tenderness in left lumbar musculature.\par ROM: full, although painful at end range of flexion and extension\par RESISTIVE TESTING: painless 5/5 resisted flex/ext, sidebending b/l, and rotation\par SPECIAL TESTS: neg SLR b/l, neg WILBER b/l, neg Trendelenburg b/l \par PULSES: 2+ DP/PT pulses\par NEURO:  L1 - S2 intact to sensation and motor, DTRs 2+/4 patella and achilles\par

## 2021-10-07 NOTE — DISCUSSION/SUMMARY
[de-identified] : Patient was seen today for reevaluation of thoracic and lumbar spasm status post fall.  He had his physical therapy evaluation yesterday, he is now awaiting authorization for physical therapy visits.  There has been little interval change in his condition since last evaluation.  He is unable to return to his regular work duties at this time.  Patient will be reassessed in 2 weeks.  He may continue taking oral NSAIDs as needed for pain.  Patient appreciates and agrees with current plan.\par \par I work as part of an academic orthopedic group and routinely have a physician in training (resident / fellow) working with me.  Any part of the history and physical exam performed by the physician in training was either directly reviewed and/or replicated by myself.  Any procedure performed by the physician in training was performed under my direct supervision and with the consent of the patient.\par \par This note was generated using dragon medical dictation software.  A reasonable effort has been made for proofreading its contents, but typos may still remain.  If there are any questions or points of clarification needed please notify my office.\par

## 2021-10-07 NOTE — RETURN TO WORK/SCHOOL
[FreeTextEntry1] : Suleiman was seen today for reevaluation of back pain.  He is still unable to perform his work duties.  He will be reassessed in 2 weeks.\par Thank you for your understanding.\par \par Sincerely,\par \par Reno Stahl DO, ATC\par Primary Care Sports Medicine\par [facility] [place]\par

## 2021-10-20 ENCOUNTER — TRANSCRIPTION ENCOUNTER (OUTPATIENT)
Age: 26
End: 2021-10-20

## 2021-10-20 ENCOUNTER — APPOINTMENT (OUTPATIENT)
Dept: ORTHOPEDIC SURGERY | Facility: CLINIC | Age: 26
End: 2021-10-20
Payer: OTHER MISCELLANEOUS

## 2021-10-20 PROCEDURE — 99072 ADDL SUPL MATRL&STAF TM PHE: CPT

## 2021-10-20 PROCEDURE — 99213 OFFICE O/P EST LOW 20 MIN: CPT

## 2021-10-20 NOTE — PHYSICAL EXAM
[de-identified] : Constitutional: Well-nourished, well-developed, No acute distress\par Respiratory:  Good respiratory effort, no SOB\par Psychiatric: Pleasant and normal affect, alert and oriented x3\par Musculoskeletal: normal except where as noted in regional exam\par \par Lumbar Spine Exam\par \par APPEARANCE: no marked deformities or malalignment, normal curvature of the lumbosacral spine. good posture\par POSITIVE TENDERNESS: + Right lumbar tenderness and spasm noted in erector spinae and quadratus lumborum\par NONTENDER: no bony midline tenderness, no marked tenderness in left lumbar musculature.\par ROM: Limited flexion/extension, limited SB and Rot in all directions due to stiffness and pain \par RESISTIVE TESTING: painful 4/5 resisted flex/ext, sidebending b/l, and rotation\par SPECIAL TESTS: neg SLR b/l, neg WILBER b/l, neg Trendelenburg b/l \par PULSES: 2+ DP/PT pulses\par NEURO:  L1 - S2 intact to sensation and motor, DTRs 2+/4 patella and achilles\par

## 2021-10-20 NOTE — DISCUSSION/SUMMARY
[de-identified] : Patient was seen today for reevaluation of low back pain after work injury sustained on 9/10/2021.  At last visit patient had already had his physical therapy evaluation, but was awaiting approval of physical therapy visits.  He states he is still not receive notification of approval regarding physical therapy.  The patient has limited clinical progress due to lack of treatment.  Patient should have received at least on physical therapy by now, he is more than 1 month status post injury.  The delay on the side of the Worker's Compensation to allow him to proceed with physical therapy is the primary cause for the delay in his recovery.  Patient is advised that we will have one of my staff members reach out to his work comp  to see what is the delay and to inquire if we can get physical therapy visits approved.  Patient remain out of work at this time.  He will be reassessed in 2 weeks.  Patient appreciates and agrees with current plan.\par \par I work as part of an academic orthopedic group and routinely have a physician in training (resident / fellow) working with me.  Any part of the history and physical exam performed by the physician in training was either directly reviewed and/or replicated by myself.  Any procedure performed by the physician in training was performed under my direct supervision and with the consent of the patient.\par \par This note was generated using dragon medical dictation software.  A reasonable effort has been made for proofreading its contents, but typos may still remain.  If there are any questions or points of clarification needed please notify my office.

## 2021-10-20 NOTE — RETURN TO WORK/SCHOOL
[FreeTextEntry1] : Suleiman was seen today for reevaluation of low back pain.  He is still not been approved for physical therapy visits, he is still had no significant interval change in his condition.  He is still unable to perform his work duties at this time.  He will be reassessed in 2 weeks.\par Thank you for your understanding.\par \par Sincerely,\par \par Reno Stahl DO, ATC\par Primary Care Sports Medicine\par [facility] [place]\par

## 2021-10-20 NOTE — HISTORY OF PRESENT ILLNESS
[de-identified] : Patient is here for LBP, for follow up, consistent pain specially when picking up daughter, insurance did not approve of the PT yet, just had an evaluation. Tylenol as needed. Has not went to work since Sept 10th. Bending forward exacerbates the pain. Denies any numbness or tingling running down both legs while standing or sitting. Had to switch cars and drive a more spacious one as his left foot would go numb after sitting down for a period of time.

## 2021-11-03 ENCOUNTER — APPOINTMENT (OUTPATIENT)
Dept: ORTHOPEDIC SURGERY | Facility: CLINIC | Age: 26
End: 2021-11-03
Payer: OTHER MISCELLANEOUS

## 2021-11-03 PROCEDURE — 99072 ADDL SUPL MATRL&STAF TM PHE: CPT

## 2021-11-03 PROCEDURE — 99214 OFFICE O/P EST MOD 30 MIN: CPT

## 2021-11-04 ENCOUNTER — FORM ENCOUNTER (OUTPATIENT)
Age: 26
End: 2021-11-04

## 2021-11-04 NOTE — HISTORY OF PRESENT ILLNESS
[de-identified] : Patient is here for atraumatic neck and upper back pain, onset yesterday morning, after showering he layed in bed felt constant sharp pain in the neck region and thoracic region. Went to [facility] [place], no imaging and was given muscle relaxant and oxycodone, and he was told to follow up with physician. Have been to PT 4 sessions for low back pain. Denies any numbness or tingling at the upper extremities.  Patient is still having low back pain, this is not exacerbated since his neck and upper back pain started yesterday.

## 2021-11-04 NOTE — PHYSICAL EXAM
[de-identified] : Constitutional: Well-nourished, well-developed, No acute distress\par Respiratory:  Good respiratory effort, no SOB\par Psychiatric: Pleasant and normal affect, alert and oriented x3\par Musculoskeletal: normal except where as noted in regional exam\par \par Cervical Spine Exam\par APPEARANCE: no marked deformities or malalignment, normal curvature, good posture\par POSITIVE TENDERNESS: + Bilateral upper trapezius, levator scapula, rhomboid major, and rhomboid minor, + spasm noted in the same muscles.\par NONTENDER: no bony midline tenderness.\par ROM: limited in all planes, most notably in flexion and sidebending due to pain\par RESISTIVE TESTING: painful 4/5 resisted ext, bilateral sidebending, rotation and shoulder shrug , 5/5 flexion \par SPECIAL TESTS: neg Spurling's b/l\par Vasc: 2+ radial pulse b/l\par Neuro: C5 - T1 intact to motor, DTRs 2+/4 biceps, triceps, brachioradialis\par Sensation: Intact to light touch throughout b/l UE\par \par Thoracic Spine Exam:\par \par normal curvature and normal alignment. good posture. no midline bony tenderness, + marked spasm and associated tenderness of bilateral paraspinal and periscapular muscles.  ROM mildly limited in sidebending and rotation due to noted spasm\par \par Lumbar Spine Exam\par \par APPEARANCE: no marked deformities or malalignment, normal curvature of the lumbosacral spine. good posture\par POSITIVE TENDERNESS: + Right lumbar tenderness and spasm noted in erector spinae and quadratus lumborum\par NONTENDER: no bony midline tenderness, no marked tenderness in left lumbar musculature.\par ROM: Limited flexion/extension, limited SB and Rot in all directions due to stiffness and pain \par RESISTIVE TESTING: painful 4/5 resisted flex/ext, sidebending b/l, and rotation\par SPECIAL TESTS: neg SLR b/l, neg WILBER b/l, neg Trendelenburg b/l \par PULSES: 2+ DP/PT pulses\par NEURO:  L1 - S2 intact to sensation and motor, DTRs 2+/4 patella and achilles\par

## 2021-11-04 NOTE — RETURN TO WORK/SCHOOL
[FreeTextEntry1] : Donna was seen today for reevaluation of neck and back pain from his workers comp injury.  He is undergoing further treatment and diagnostic imaging.  He is still unable to return to work duties.  He will be reassessed in 2 weeks.\par Thank you for your understanding.\par \par Sincerely,\par \par Reno Stahl DO, ATC\par Primary Care Sports Medicine\par [facility] [place]\par

## 2021-11-17 ENCOUNTER — APPOINTMENT (OUTPATIENT)
Dept: ORTHOPEDIC SURGERY | Facility: CLINIC | Age: 26
End: 2021-11-17
Payer: OTHER MISCELLANEOUS

## 2021-11-17 DIAGNOSIS — M62.830 MUSCLE SPASM OF BACK: ICD-10-CM

## 2021-11-17 DIAGNOSIS — M54.2 CERVICALGIA: ICD-10-CM

## 2021-11-17 PROCEDURE — 99214 OFFICE O/P EST MOD 30 MIN: CPT

## 2021-11-17 PROCEDURE — 99072 ADDL SUPL MATRL&STAF TM PHE: CPT

## 2021-11-17 NOTE — DISCUSSION/SUMMARY
[de-identified] : Patient was seen today for reevaluation and continue management of cervical, thoracic, and lumbar back pain.  Patient is having persistent pain despite extensive course of conservative management.  We were able to obtain MRI of the cervical spine which showed that he had some mild disc herniations at C5-6 and C6-7, with right-sided impingement, this is the likely etiology of his persisting neck pain with intermittent right upper extremity radiculopathy.  We were also able to obtain MRI of the lumbar spine which showed that he had some mild disc herniations at L4-L5 and L5-S1 with mild right-sided nerve impingement as likely etiology of his persisting low back pain with intermittent right lower extremity radiculopathy.  His thoracic back pain is likely due to compensatory muscle spasm, doubt significant disc herniation in this location, recommend deferral of MRI of the thoracic spine at this time.  With the chronicity of the patient's pain, and his limited response to conservative management, I do recommend at this time he proceed with physiatry consultation to discuss risk/benefits of epidural steroid injections.  Patient has no motor weakness or permanent sensory deficits since time of injury, do not feel that surgical consultation is the next step in his management.  Patient is advised I am unable to determine if these disc herniations were caused by his recent fall, but they were certainly asymptomatic prior to his recent fall and seem to have become symptomatic since that time.  Patient will be transition to the care of physiatry to determine if he should undergo epidural steroid injection, and based on his response they will determine when he can return to full work duty without restrictions.  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  Based on his history and clinical exam he has sufficient range of motion, strength and function to safely operate a motor vehicle, he will be given clearance to return to limited work duty of driving only.  Patient appreciates and agrees with current plan.\par \par I work as part of an academic orthopedic group and routinely have a physician in training (resident / fellow) working with me.  Any part of the history and physical exam performed by the physician in training was either directly reviewed and/or replicated by myself.  Any procedure performed by the physician in training was performed under my direct supervision and with the consent of the patient.\par \par This note was generated using dragon medical dictation software.  A reasonable effort has been made for proofreading its contents, but typos may still remain.  If there are any questions or points of clarification needed please notify my office.\par

## 2021-11-17 NOTE — RETURN TO WORK/SCHOOL
[FreeTextEntry1] : Donna was seen today for reevaluation of neck and low back pain.  He is being referred to physiatry for consultation regarding possible epidural steroid injections.  At this time he has sufficient recovery to return to driving duty only, no lifting or twisting activity.  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  He will be assessed by physiatry and they will help determine when he may be able to return to full work duty without restrictions.\par Thank you for your understanding.\par \par Sincerely,\par \par Reno Stahl DO, ATC\par Primary Care Sports Medicine\par  [facility] [place]\par

## 2021-11-17 NOTE — PHYSICAL EXAM
[de-identified] : Constitutional: Well-nourished, well-developed, No acute distress\par Respiratory:  Good respiratory effort, no SOB\par Psychiatric: Pleasant and normal affect, alert and oriented x3\par Musculoskeletal: normal except where as noted in regional exam\par \par Cervical Spine Exam\par APPEARANCE: no marked deformities or malalignment, normal curvature, good posture\par POSITIVE TENDERNESS: + Bilateral upper trapezius, levator scapula, rhomboid major, and rhomboid minor, + spasm noted in the same muscles.\par NONTENDER: no bony midline tenderness.\par ROM: limited in all planes, most notably in flexion and sidebending due to pain\par RESISTIVE TESTING: painful 4/5 resisted ext, bilateral sidebending, rotation and shoulder shrug , 5/5 flexion \par SPECIAL TESTS: neg Spurling's b/l\par Vasc: 2+ radial pulse b/l\par Neuro: C5 - T1 intact to motor, DTRs 2+/4 biceps, triceps, brachioradialis\par Sensation: Intact to light touch throughout b/l UE\par \par Thoracic Spine Exam:\par \par normal curvature and normal alignment. good posture. no midline bony tenderness, + marked spasm and associated tenderness of bilateral paraspinal and periscapular muscles.  ROM mildly limited in sidebending and rotation due to noted spasm\par \par Lumbar Spine Exam\par \par APPEARANCE: no marked deformities or malalignment, normal curvature of the lumbosacral spine. good posture\par POSITIVE TENDERNESS: + Right lumbar tenderness and spasm noted in erector spinae and quadratus lumborum\par NONTENDER: no bony midline tenderness, no marked tenderness in left lumbar musculature.\par ROM: Limited flexion/extension, limited SB and Rot in all directions due to stiffness and pain \par RESISTIVE TESTING: painful 4/5 resisted flex/ext, sidebending b/l, and rotation\par SPECIAL TESTS: neg SLR b/l, neg WILBER b/l, neg Trendelenburg b/l \par PULSES: 2+ DP/PT pulses\par NEURO:  L1 - S2 intact to sensation and motor, DTRs 2+/4 patella and achilles\par  [de-identified] : I reviewed, interpreted and clinically correlated the following outside imaging studies,\par  DATE OF EXAM: 11/12/2021\par MRI-LUMBAR SPINE NON CONTRAST\par HISTORY: M54.5 Lower Back Pain\par TECHNIQUE: Sagittal T1, STIR and T2 weighted images were supplemented by axial\par proton density images through the disc spaces. Study was performed on a 3 [employer]\par ultra high field wide bore magnet.\par FINDINGS:\par Vertebral body heights: Maintained.\par Alignment: Normal.\par Marrow signal: No acute fracture or marrow replacing lesion is seen\par Spinal canal: The conus medullaris is normal.\par Paravertebral soft tissues: Unremarkable.\par Discs: Please see below.\par L1-2: There is no disc bulge, herniation, thecal sac compression or foraminal\par narrowing.\par L2-3: There is no disc bulge, herniation, thecal sac compression or foraminal\par narrowing.\par L3-4: There is no disc bulge, herniation, thecal sac compression or foraminal\par narrowing.\par L4-5: There is a disc bulge and superimposed right foraminal disc herniation\par narrowing the inferior aspect of the right neural foramen and contacting the\par exiting right L4 nerve root. There is mild bilateral facet arthropathy. There is\par no significant spinal canal stenosis. There is moderate right and mild left\par foraminal narrowing.\par L5-S1: There is disc bulge eccentric to the right narrowing the right neural\par foramen and contacting the exiting right L5 nerve root. There is mild bilateral\par facet arthropathy. There is mild to moderate and moderate right foraminal\par narrowing. There is no spinal canal stenosis.\par IMPRESSION: \par Page 2 of 2\par L4-5: There is a disc bulge and superimposed right foraminal disc herniation\par narrowing the inferior aspect of the right neural foramen and contacting the\par exiting right L4 nerve root. There is mild bilateral facet arthropathy. There is\par no significant spinal canal stenosis. There is moderate right and mild left\par foraminal narrowing.\par L5-S1: There is disc bulge eccentric to the right narrowing the right neural\par foramen and contacting the exiting right L5 nerve root. There is mild bilateral\par facet arthropathy. There is mild to moderate and moderate right foraminal\par narrowing. There is no spinal canal stenosis. \par \par \par \par DATE OF EXAM: 11/12/2021\par MRI-CERVICAL SPINE NON CONTRAST\par HISTORY: M54.2 Cervical/ Neck Pain\par TECHNIQUE: Sagittal T1, T2 and STIR images were supplemented by axial gradient\par echo images through the disc spaces. Study was performed on a 3 [employer] ultra\par high field wide bore magnet.\par FINDINGS:\par There is straightening of cervical lordosis. Cervical vertebral body heights are\par maintained. There is no acute compression fracture. There is disc dehydration\par and mild disc height loss at C5-C6 and C6-C7 levels. Cerebellar tonsils are in\par normal location. Cervical spinal cord is normal in size and signal. There is no\par prevertebral soft tissue swelling.\par C2-C3: There is left paracentral disc herniation. There is no significant spinal\par canal or foraminal stenosis.\par C3-C4: There is no disc bulge, herniation, thecal sac compression or foraminal\par narrowing.\par C4-C5: There is no disc bulge, herniation, thecal sac compression or foraminal\par narrowing.\par C5-C6: There is right paracentral disc herniation indenting upon the ventral\par thecal sac. There is mild spinal canal stenosis. There is no foraminal\par narrowing.\par C6-C7: There is right paracentral disc herniation indenting upon the ventral\par thecal sac. There is mild spinal canal stenosis. There is mild right foraminal\par narrowing.\par C7-T1: There is no disc bulge, herniation, thecal sac compression or foraminal\par narrowing.\par Paraspinal soft tissues: Not evaluated on this MR examination of the cervical\par spine.\par Thyroid gland: Not evaluated on this MR examination of the cervical spine.\par IMPRESSION: \par Page 2 of 2\par Straightening of cervical lordosis.\par C5-C6: There is right paracentral disc herniation indenting upon the ventral\par thecal sac. There is mild spinal canal stenosis. There is no foraminal\par narrowing.\par C6-C7: There is right paracentral disc herniation indenting upon the ventral\par thecal sac. There is mild spinal canal stenosis. There is mild right foraminal\par narrowing.\par C2-C3: There is left paracentral disc herniation. There is no significant spinal\par canal or foraminal stenosis

## 2021-11-17 NOTE — HISTORY OF PRESENT ILLNESS
[de-identified] : Patient is here for neck/LBP follow up. Currently completed 13 sessions of PT, noticed good mobility in the neck region,sudden neck movements would instigate the neck pain. Denies any numbness running both arms and no weakness. One episode of fall in the morning 2 weeks after getting out of bed " legs went numb". Same location of pain on the right side of the back, some days are worse than others to the point " it brought me to tears" associated with intermittent numbness and tingling running down the lower extremities. Managing pain with NSAID and muscle relaxant depending on how bad it gets. He is requesting for refill of NSAIDS.

## 2022-06-08 ENCOUNTER — OUTPATIENT (OUTPATIENT)
Dept: OUTPATIENT SERVICES | Facility: HOSPITAL | Age: 27
LOS: 1 days | End: 2022-06-08
Payer: SELF-PAY

## 2022-06-08 DIAGNOSIS — Z11.52 ENCOUNTER FOR SCREENING FOR COVID-19: ICD-10-CM

## 2022-06-08 LAB — SARS-COV-2 RNA SPEC QL NAA+PROBE: SIGNIFICANT CHANGE UP

## 2022-06-08 PROCEDURE — U0005: CPT

## 2022-06-08 PROCEDURE — U0003: CPT

## 2022-06-08 PROCEDURE — C9803: CPT

## 2023-11-29 NOTE — ED PROVIDER NOTE - PSYCHIATRIC [+], MLM
Call primary care provider for follow up after discharge/Activities as tolerated/Low salt diet/Monitor weight daily/Report signs and symptoms to primary care provider
self mutilation